# Patient Record
Sex: MALE | Race: WHITE | NOT HISPANIC OR LATINO | Employment: UNEMPLOYED | URBAN - METROPOLITAN AREA
[De-identification: names, ages, dates, MRNs, and addresses within clinical notes are randomized per-mention and may not be internally consistent; named-entity substitution may affect disease eponyms.]

---

## 2018-07-23 ENCOUNTER — HOSPITAL ENCOUNTER (EMERGENCY)
Facility: MEDICAL CENTER | Age: 58
End: 2018-07-23
Attending: EMERGENCY MEDICINE
Payer: MEDICARE

## 2018-07-23 VITALS
HEIGHT: 69 IN | HEART RATE: 88 BPM | BODY MASS INDEX: 21.22 KG/M2 | DIASTOLIC BLOOD PRESSURE: 57 MMHG | OXYGEN SATURATION: 96 % | SYSTOLIC BLOOD PRESSURE: 101 MMHG | TEMPERATURE: 98.4 F | WEIGHT: 143.3 LBS | RESPIRATION RATE: 16 BRPM

## 2018-07-23 DIAGNOSIS — Z59.00 HOMELESSNESS: ICD-10-CM

## 2018-07-23 DIAGNOSIS — Z76.0 MEDICATION REFILL: ICD-10-CM

## 2018-07-23 PROCEDURE — 99284 EMERGENCY DEPT VISIT MOD MDM: CPT

## 2018-07-23 RX ORDER — QUETIAPINE FUMARATE 25 MG/1
25 TABLET, FILM COATED ORAL 3 TIMES DAILY
COMMUNITY
End: 2018-07-23

## 2018-07-23 RX ORDER — QUETIAPINE FUMARATE 200 MG/1
400 TABLET, FILM COATED ORAL
COMMUNITY
End: 2018-07-23

## 2018-07-23 RX ORDER — QUETIAPINE FUMARATE 300 MG/1
300 TABLET, FILM COATED ORAL
Qty: 30 TAB | Refills: 1 | Status: SHIPPED | OUTPATIENT
Start: 2018-07-23

## 2018-07-23 RX ORDER — ALPRAZOLAM 1 MG/1
2 TABLET ORAL 2 TIMES DAILY
COMMUNITY

## 2018-07-23 RX ORDER — QUETIAPINE FUMARATE 25 MG/1
50 TABLET, FILM COATED ORAL 2 TIMES DAILY
Qty: 60 TAB | Refills: 1 | Status: SHIPPED | OUTPATIENT
Start: 2018-07-23

## 2018-07-23 SDOH — ECONOMIC STABILITY - HOUSING INSECURITY: HOMELESSNESS UNSPECIFIED: Z59.00

## 2018-07-23 ASSESSMENT — LIFESTYLE VARIABLES: DO YOU DRINK ALCOHOL: NO

## 2018-07-24 ENCOUNTER — PATIENT OUTREACH (OUTPATIENT)
Dept: HEALTH INFORMATION MANAGEMENT | Facility: OTHER | Age: 58
End: 2018-07-24

## 2018-07-24 NOTE — DISCHARGE PLANNING
Medical Social Work    Referral: Resources    Intervention: MSW received a call from bedside RN that per ERP pt is in need of housing resources and assistance obtaining his medications.  MSW met with pt at bedside.  Pt states that he just moved to [place] from [facility] and has limited income.  Pt states that he's been staying at a weekly motel but has no where to stay tonight.  Pt states that he's familiar with the homeless shelter and may have to stay there tonight.  Pt states that he has been without his Medicare for the past 8 years until recently.  Pt was provided with low income housing list and shelter list.  Pt was also provided with Clinic list and was recommended to follow up with [facility] to get established with a PCP.  Pt states that he will be going to the shelter tonight and requested assistance getting there.  Pt was provided with a 2-ride bus pass.  Bedside RN made aware.    Plan: Pt to D/C to the shelter via bus.

## 2018-07-24 NOTE — ED PROVIDER NOTES
"ED Provider Note    CHIEF COMPLAINT  Chief Complaint   Patient presents with   • Off Psych Meds   • Medication Refill     Seen at 8:56 PM    HPI  Woo Bowers is a 58 y.o. male with history of bipolar who presents here to have refills of his Seroquel, Xanax, possible hospitalization, a meal tray and to talk with social work about section 8 housing.    He has been living in the [facility] for the past 2 weeks.  He moved here from [place].  He ran out of money and no longer lives in the hotel room until his check comes in at the end of this month.  He has been off of his psychiatric medications for at least 3 months.  He was stabilized at an inpatient psychiatry in [place] and discharged from there several months ago.    He plans to gain employment though he feels that he needs to be stabilized him back on his psychiatric medications.    He denies any suicidality or homicidality.  REVIEW OF SYSTEMS  See HPI  PAST MEDICAL HISTORY   has a past medical history of Psychiatric disorder.    SOCIAL HISTORY  Social History     Social History Main Topics   • Smoking status: Current Every Day Smoker     Packs/day: 1.00     Years: 20.00   • Smokeless tobacco: Not on file   • Alcohol use No   • Drug use: Yes     Types: Inhaled      Comment: marihuna    • Sexual activity: Not on file       SURGICAL HISTORY   has a past surgical history that includes other abdominal surgery and other.    CURRENT MEDICATIONS  Reviewed.  See Encounter Summary.     ALLERGIES  No Known Allergies    PHYSICAL EXAM  VITAL SIGNS: BP (!) 94/57   Pulse 85   Temp 36.9 °C (98.4 °F)   Resp 16   Ht 1.753 m (5' 9\")   Wt 65 kg (143 lb 4.8 oz)   SpO2 94%   BMI 21.16 kg/m²   Constitutional: Awake, alert in no apparent distress.  HENT: Normocephalic, Bilateral external ears normal. Nose normal.   Eyes: Conjunctiva normal, non-icteric, EOMI.    Thorax & Lungs: Easy unlabored respirations  Cardiovascular:    Abdomen:  No distention  Skin: Visualized skin " is  Dry, No erythema, No rash.   Extremities:   atraumatic   Neurologic: Alert, Grossly non-focal.   Psychiatric: Affect and Mood normal, cooperative, does not appear to be responding to internal stimuli.  No pressured speech.  No psychomotor agitation or depression.    RADIOLOGY  No orders to display       Nursing notes and vital signs were reviewed. (See chart for details)    Decision Making:  This is a 58 y.o. year old male who presents with a chief complaint of medication refill.  He also would like to talk to social work about getting affordable housing, he would like to have a food tray and he is asking if it is possible to get hospitalized as he has no place to stay tonight.  There is no medical indication for hospitalization.  He is stable for discharge as he does not appear to be greatly disabled, he is not suicidal nor homicidal.  I will not refill his Xanax.  I let them of the prescription monitoring program, he does not have any prescriptions in the past year for Xanax.  I will refill his Seroquel.  I recommend he follow-up with the [facility] clinic.  Social work did give him resources and he did eat a snack prior to discharge.    DISPOSITION:  Patient will be discharged home in good condition.    Discharge Medications:  New Prescriptions    No medications on file       The patient was discharged home (see d/c instructions) and told to return immediately for any signs or symptoms listed, or any worsening at all.  The patient verbally agreed to the discharge precautions and follow-up plan which is documented in [facility].      The patient was discharged home (see d/c instructions) and parent was told to return immediately for any signs or symptoms listed, or any worsening at all.  The patient's parent verbally agreed to the discharge precautions and follow-up plan which is documented in [facility].    FINAL IMPRESSION  1. Medication refill    2. Homelessness

## 2018-07-24 NOTE — ED NOTES
Pt discharged home per [facility], after SW evaluation. Pt in stable condition, discharged instructions and prescription given. Voiced understanding.

## 2018-07-24 NOTE — ED TRIAGE NOTES
Woo Bowers  58 y.o.  Chief Complaint   Patient presents with   • Off Psych Meds   • Medication Refill     Pt self ambulatory to triage room, steady gait. NAD noted.     Pt states he is bi-polar. Pt appears manic presently. Pt states he needs his prescriptions for Seroquel and zanex. Pt states he moved from [facility] and has not been able to fill prescriptions.     Triage process explained to patient, educated pt to notify staff at  if s/s worsened, apologized for wait time, and returned pt to lobby.

## 2018-07-27 ENCOUNTER — HOSPITAL ENCOUNTER (EMERGENCY)
Facility: MEDICAL CENTER | Age: 58
End: 2018-07-27
Attending: EMERGENCY MEDICINE
Payer: MEDICARE

## 2018-07-27 VITALS
HEIGHT: 71 IN | WEIGHT: 142.2 LBS | OXYGEN SATURATION: 93 % | HEART RATE: 68 BPM | BODY MASS INDEX: 19.91 KG/M2 | TEMPERATURE: 97.9 F | DIASTOLIC BLOOD PRESSURE: 78 MMHG | SYSTOLIC BLOOD PRESSURE: 105 MMHG | RESPIRATION RATE: 18 BRPM

## 2018-07-27 DIAGNOSIS — F99 PSYCHOLOGICAL DISORDER: ICD-10-CM

## 2018-07-27 LAB
ALBUMIN SERPL BCP-MCNC: 4.4 G/DL (ref 3.2–4.9)
ALBUMIN/GLOB SERPL: 1.7 G/DL
ALP SERPL-CCNC: 52 U/L (ref 30–99)
ALT SERPL-CCNC: 18 U/L (ref 2–50)
AMPHET UR QL SCN: NEGATIVE
ANION GAP SERPL CALC-SCNC: 9 MMOL/L (ref 0–11.9)
APAP SERPL-MCNC: <10 UG/ML (ref 10–30)
AST SERPL-CCNC: 27 U/L (ref 12–45)
BARBITURATES UR QL SCN: NEGATIVE
BENZODIAZ UR QL SCN: NEGATIVE
BILIRUB SERPL-MCNC: 0.7 MG/DL (ref 0.1–1.5)
BUN SERPL-MCNC: 7 MG/DL (ref 8–22)
BZE UR QL SCN: NEGATIVE
CALCIUM SERPL-MCNC: 9.3 MG/DL (ref 8.5–10.5)
CANNABINOIDS UR QL SCN: POSITIVE
CHLORIDE SERPL-SCNC: 101 MMOL/L (ref 96–112)
CO2 SERPL-SCNC: 24 MMOL/L (ref 20–33)
CREAT SERPL-MCNC: 0.83 MG/DL (ref 0.5–1.4)
ETHANOL BLD-MCNC: 0 G/DL
GLOBULIN SER CALC-MCNC: 2.6 G/DL (ref 1.9–3.5)
GLUCOSE SERPL-MCNC: 88 MG/DL (ref 65–99)
METHADONE UR QL SCN: NEGATIVE
OPIATES UR QL SCN: NEGATIVE
OXYCODONE UR QL SCN: NEGATIVE
PCP UR QL SCN: NEGATIVE
POC BREATHALIZER: 0 PERCENT (ref 0–0.01)
POTASSIUM SERPL-SCNC: 3.4 MMOL/L (ref 3.6–5.5)
PROPOXYPH UR QL SCN: NEGATIVE
PROT SERPL-MCNC: 7 G/DL (ref 6–8.2)
SALICYLATES SERPL-MCNC: 0 MG/DL (ref 15–25)
SODIUM SERPL-SCNC: 134 MMOL/L (ref 135–145)

## 2018-07-27 PROCEDURE — 80307 DRUG TEST PRSMV CHEM ANLYZR: CPT

## 2018-07-27 PROCEDURE — 700105 HCHG RX REV CODE 258: Performed by: EMERGENCY MEDICINE

## 2018-07-27 PROCEDURE — 99284 EMERGENCY DEPT VISIT MOD MDM: CPT

## 2018-07-27 PROCEDURE — 302970 POC BREATHALIZER: Performed by: EMERGENCY MEDICINE

## 2018-07-27 PROCEDURE — 80053 COMPREHEN METABOLIC PANEL: CPT

## 2018-07-27 PROCEDURE — 90791 PSYCH DIAGNOSTIC EVALUATION: CPT

## 2018-07-27 PROCEDURE — 93005 ELECTROCARDIOGRAM TRACING: CPT | Performed by: EMERGENCY MEDICINE

## 2018-07-27 PROCEDURE — 36415 COLL VENOUS BLD VENIPUNCTURE: CPT

## 2018-07-27 RX ORDER — DIPHENHYDRAMINE HCL 12.5MG/5ML
12.5 LIQUID (ML) ORAL 4 TIMES DAILY PRN
COMMUNITY

## 2018-07-27 RX ORDER — SODIUM CHLORIDE 9 MG/ML
1000 INJECTION, SOLUTION INTRAVENOUS ONCE
Status: COMPLETED | OUTPATIENT
Start: 2018-07-27 | End: 2018-07-27

## 2018-07-27 RX ADMIN — SODIUM CHLORIDE 1000 ML: 9 INJECTION, SOLUTION INTRAVENOUS at 15:16

## 2018-07-27 NOTE — ED TRIAGE NOTES
Pt ambulatory to triage. Pt states he is being seen at [facility] and needs a medical clearence. Pt states that he takes upward of 25 benadryl a day for 3 months. He states he is addicated to benadryl. Pt states that he was without his psych meds for a while and started self-medicating with benadryl. Per chart review pt was seen here 4 days ago.     Chief Complaint   Patient presents with   • Medical Clearance     Pt placed in lobby, updated on triage process. Pt educated to notified RN or triage tech if changes in condition.
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
## 2018-07-28 NOTE — DISCHARGE PLANNING
Alert team note:  Sent packets to [facility] and [facility].  Both denied him due to lack of acuity.  Provided resources for [facility] walk-in M-F 8-5 and Dr Gtz's office M-F for medication and counseling.

## 2018-07-28 NOTE — ED PROVIDER NOTES
ED Provider Note    CHIEF COMPLAINT  Chief Complaint   Patient presents with   • Medical Clearance       HPI  Woo Bowers is a 58 y.o. male who presents to the emergency department complaining that she needs to go to [facility] for a medication adjustment.  The patient says that he is from [place] and has been stranded here in [place] for the last several months and has run out of his psychiatric medications.  The patient is trying to treat himself with Benadryl and is taking multiple tablets every day.  He says he last took several Benadryl tablets 2 hours prior to arrival in the ER.  The patient says that he went to [facility] and was told that he needs medical clearance before he can undergo treatment there and was sent to the emergency department.  The patient has no wish to harm himself or others.  Chart review shows that the patient was actually here 4 days ago requesting a medication refill and was given a refill prescription for Seroquel and was denied refill of Xanax.    REVIEW OF SYSTEMS no fever or chills no nausea vomiting no chest pain or difficulty breathing no hallucinations.  The patient does not wish to harm himself or others.  All other systems negative    PAST MEDICAL HISTORY  Past Medical History:   Diagnosis Date   • Psychiatric disorder        FAMILY HISTORY  Family History   Problem Relation Age of Onset   • Hypertension Father    • Psychiatry Brother    • Alcohol/Drug Brother        SOCIAL HISTORY  Social History     Social History   • Marital status: Single     Spouse name: N/A   • Number of children: N/A   • Years of education: N/A     Social History Main Topics   • Smoking status: Current Every Day Smoker     Packs/day: 1.00     Years: 20.00   • Smokeless tobacco: Never Used   • Alcohol use No   • Drug use: Yes     Types: Inhaled      Comment: marijuana, benadryl    • Sexual activity: Not on file     Other Topics Concern   • Not on file     Social History Narrative   • No  "narrative on file       SURGICAL HISTORY  Past Surgical History:   Procedure Laterality Date   • OTHER      tonsillectomy    • OTHER ABDOMINAL SURGERY         CURRENT MEDICATIONS  Home Medications     Reviewed by Carito Ornelas R.N. (Registered Nurse) on 07/27/18 at 1410  Med List Status: Partial   Medication Last Dose Status   ALPRAZolam (XANAX) 1 MG Tab  Active   QUEtiapine (SEROQUEL) 25 MG Tab  Active   QUEtiapine (SEROQUEL) 300 MG tablet  Active                ALLERGIES  No Known Allergies    PHYSICAL EXAM  VITAL SIGNS: /78   Pulse 68   Temp 36.6 °C (97.9 °F)   Resp 18   Ht 1.803 m (5' 11\")   Wt 64.5 kg (142 lb 3.2 oz)   SpO2 93%   BMI 19.83 kg/m²    Oxygen saturation is interpreted as adequate  Constitutional: Awake verbal ambulatory he does not at all appear sleepy or altered.  HENT: Mucous membranes are slightly dry, no sign of trauma to the head  Eyes: No erythema or discharge or jaundice pupils are round extraocular motion present  Neck: Trachea midline no JVD  Cardiovascular: Regular rate and rhythm  Lungs: Clear and equal bilaterally with no apparent difficulty breathing  Abdomen/Back: Soft nontender nondistended no rebound guarding or peritoneal findings bowel sounds are present  Skin: Warm and dry  Musculoskeletal: No acute bony deformity  Neurologic: Patient is awake and verbal he is ambulatory about the emergency department with good balance and mobility    CHART REVIEW  I reviewed the ER chart from July 23, 2018 the patient said he had been off of his psychiatric medications for 3 months and requested refill of Seroquel and Xanax and he was given a refill of Seroquel.    Laboratory  Complete metabolic panel is unremarkable urine toxicology screen is positive for cannabinoids.  Aspirin and Tylenol levels are nontoxic and blood alcohol level is 0    EKG interpretation  Twelve-lead EKG shows sinus rhythm 68 bpm there is no pathologic ST elevation or depression or ectopy the QTc interval " was at the upper limits of normal at 498.    MEDICAL DECISION MAKING and DISPOSITION  In the emergency department the patient arrived with a borderline low blood pressure and for this reason he was given intravenous fluids and this was helpful as reevaluation shows that his blood pressure has improved.  A life skills mental health evaluation was undertaken and the patient was referred to [facility] but they will not be accepting him for inpatient care.  At this point in time the patient looks well he does not require hospitalization and I think it is safe for him to be discharged he is instructed to go to [facility] Monday and arrange outpatient mental health care as soon as possible.  He may return here if he otherwise requires emergency medical care    IMPRESSION  1.  Psychiatric disorder  2.  Abuse of over-the-counter Benadryl       Electronically signed by: Charles Ellis, 7/27/2018 8:18 PM

## 2018-07-28 NOTE — DISCHARGE INSTRUCTIONS
Go to [facility] and arrange outpatient mental health services on Monday morning.  Only take Benadryl and all other medications as directed and do not take extra doses.

## 2018-07-29 LAB
EKG IMPRESSION: NORMAL
EKG IMPRESSION: NORMAL

## 2022-07-05 ENCOUNTER — INPATIENT (INPATIENT)
Facility: HOSPITAL | Age: 62
LOS: 1 days | Discharge: LEFT BEFORE DISCHARGE | End: 2022-07-07
Attending: HOSPITALIST | Admitting: HOSPITALIST

## 2022-07-05 VITALS
SYSTOLIC BLOOD PRESSURE: 108 MMHG | HEART RATE: 76 BPM | OXYGEN SATURATION: 98 % | RESPIRATION RATE: 18 BRPM | TEMPERATURE: 98 F | DIASTOLIC BLOOD PRESSURE: 64 MMHG

## 2022-07-05 LAB
ALBUMIN SERPL ELPH-MCNC: 3.5 G/DL — SIGNIFICANT CHANGE UP (ref 3.3–5)
ALP SERPL-CCNC: 155 U/L — HIGH (ref 40–120)
ALT FLD-CCNC: 43 U/L — HIGH (ref 4–41)
ANION GAP SERPL CALC-SCNC: 13 MMOL/L — SIGNIFICANT CHANGE UP (ref 7–14)
APAP SERPL-MCNC: <10 UG/ML — LOW (ref 15–25)
AST SERPL-CCNC: 72 U/L — HIGH (ref 4–40)
BILIRUB SERPL-MCNC: 0.2 MG/DL — SIGNIFICANT CHANGE UP (ref 0.2–1.2)
BUN SERPL-MCNC: 8 MG/DL — SIGNIFICANT CHANGE UP (ref 7–23)
CALCIUM SERPL-MCNC: 8.6 MG/DL — SIGNIFICANT CHANGE UP (ref 8.4–10.5)
CHLORIDE SERPL-SCNC: 98 MMOL/L — SIGNIFICANT CHANGE UP (ref 98–107)
CO2 SERPL-SCNC: 21 MMOL/L — LOW (ref 22–31)
CREAT SERPL-MCNC: 0.67 MG/DL — SIGNIFICANT CHANGE UP (ref 0.5–1.3)
EGFR: 106 ML/MIN/1.73M2 — SIGNIFICANT CHANGE UP
ETHANOL SERPL-MCNC: 127 MG/DL — HIGH
GLUCOSE SERPL-MCNC: 84 MG/DL — SIGNIFICANT CHANGE UP (ref 70–99)
HCT VFR BLD CALC: 36.3 % — LOW (ref 39–50)
HGB BLD-MCNC: 11.8 G/DL — LOW (ref 13–17)
IANC: 1.21 K/UL — LOW (ref 1.8–7.4)
LIDOCAIN IGE QN: 57 U/L — SIGNIFICANT CHANGE UP (ref 7–60)
MCHC RBC-ENTMCNC: 31.2 PG — SIGNIFICANT CHANGE UP (ref 27–34)
MCHC RBC-ENTMCNC: 32.5 GM/DL — SIGNIFICANT CHANGE UP (ref 32–36)
MCV RBC AUTO: 96 FL — SIGNIFICANT CHANGE UP (ref 80–100)
PLATELET # BLD AUTO: 206 K/UL — SIGNIFICANT CHANGE UP (ref 150–400)
POTASSIUM SERPL-MCNC: 4.3 MMOL/L — SIGNIFICANT CHANGE UP (ref 3.5–5.3)
POTASSIUM SERPL-SCNC: 4.3 MMOL/L — SIGNIFICANT CHANGE UP (ref 3.5–5.3)
PROT SERPL-MCNC: 7.1 G/DL — SIGNIFICANT CHANGE UP (ref 6–8.3)
RBC # BLD: 3.78 M/UL — LOW (ref 4.2–5.8)
RBC # FLD: 15.9 % — HIGH (ref 10.3–14.5)
SALICYLATES SERPL-MCNC: <0.3 MG/DL — LOW (ref 15–30)
SODIUM SERPL-SCNC: 132 MMOL/L — LOW (ref 135–145)
TOXICOLOGY SCREEN, DRUGS OF ABUSE, SERUM RESULT: SIGNIFICANT CHANGE UP
WBC # BLD: 2.78 K/UL — LOW (ref 3.8–10.5)
WBC # FLD AUTO: 2.78 K/UL — LOW (ref 3.8–10.5)

## 2022-07-05 PROCEDURE — 71045 X-RAY EXAM CHEST 1 VIEW: CPT | Mod: 26

## 2022-07-05 PROCEDURE — 99284 EMERGENCY DEPT VISIT MOD MDM: CPT | Mod: 25

## 2022-07-05 PROCEDURE — 93010 ELECTROCARDIOGRAM REPORT: CPT

## 2022-07-05 PROCEDURE — 74177 CT ABD & PELVIS W/CONTRAST: CPT | Mod: 26,MA

## 2022-07-05 RX ORDER — THIAMINE MONONITRATE (VIT B1) 100 MG
100 TABLET ORAL ONCE
Refills: 0 | Status: COMPLETED | OUTPATIENT
Start: 2022-07-05 | End: 2022-07-05

## 2022-07-05 RX ORDER — FOLIC ACID 0.8 MG
1 TABLET ORAL ONCE
Refills: 0 | Status: COMPLETED | OUTPATIENT
Start: 2022-07-05 | End: 2022-07-05

## 2022-07-05 RX ORDER — SODIUM CHLORIDE 9 MG/ML
1000 INJECTION INTRAMUSCULAR; INTRAVENOUS; SUBCUTANEOUS ONCE
Refills: 0 | Status: COMPLETED | OUTPATIENT
Start: 2022-07-05 | End: 2022-07-05

## 2022-07-05 RX ADMIN — SODIUM CHLORIDE 1000 MILLILITER(S): 9 INJECTION INTRAMUSCULAR; INTRAVENOUS; SUBCUTANEOUS at 21:36

## 2022-07-05 RX ADMIN — Medication 1 MILLIGRAM(S): at 21:35

## 2022-07-05 RX ADMIN — Medication 2 MILLIGRAM(S): at 21:35

## 2022-07-05 RX ADMIN — Medication 100 MILLIGRAM(S): at 21:35

## 2022-07-05 NOTE — ED PROVIDER NOTE - PHYSICAL EXAMINATION
Vital Signs Last 24 Hrs  T(C): 36.8 (05 Jul 2022 15:45), Max: 36.8 (05 Jul 2022 15:45)  T(F): 98.2 (05 Jul 2022 15:45), Max: 98.2 (05 Jul 2022 15:45)  HR: 80 (05 Jul 2022 22:47) (76 - 80)  BP: 109/71 (05 Jul 2022 22:47) (103/54 - 109/71)  BP(mean): --  RR: 18 (05 Jul 2022 22:47) (18 - 18)  SpO2: 97% (05 Jul 2022 22:47) (95% - 98%)    CONSTITUTIONAL: NAD, well-developed  EYES: PERRLA; conjunctiva and sclera clear  ENMT: Moist oral mucosa  RESPIRATORY: Normal respiratory effort; lungs are clear to auscultation bilaterally  CARDIOVASCULAR: Regular rate and rhythm, normal S1 and S2, Peripheral pulses are 2+ bilaterally  ABDOMEN: Nontender to palpation, normoactive bowel sounds  PSYCH: A+O to person, place, and time  SKIN: bilateral LE ecchymosis

## 2022-07-05 NOTE — ED PROVIDER NOTE - PROGRESS NOTE DETAILS
AMMY: I was signed out this pt pending labs and imaging. Labs show slightly elevated LFts and minimal anemia but also Utox POsitive for THC and alcohol level 127. Pt is on CIWA. CT shows large bladder but pt able to void spontaneously without any effort and has no pain on exam and not distended abd/pelv. Will admit to hospitalist for Withdrawal on Tele.
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
## 2022-07-05 NOTE — ED PROVIDER NOTE - ATTENDING CONTRIBUTION TO CARE
63yo M ho depression eoth abuse, ran out of his seroquel about 3 months ago and has been feeling very depressed so has been drinking heavily daily, decided to come in today for help to stop drinking. also complains of abdominal pain  + ho etoh withdrawl  pt tearful in ed  mild withdrawal, abd tender  labs, benzos, Ct, admit

## 2022-07-05 NOTE — ED PROVIDER NOTE - CHIEF COMPLAINT
The patient is a 62y Male complaining of  The patient is a 62y Male complaining of alcohol intoxication

## 2022-07-05 NOTE — ED PROVIDER NOTE - CLINICAL SUMMARY MEDICAL DECISION MAKING FREE TEXT BOX
62M with PMH ETOH abuse and withdrawal BIBA for alcohol intoxication. Patient is tearful and reports drinking 5 cans of beer in the last 24 hours and admitted to cocaine use. He reports lower leg pain and abdominal pain but denies N/V, HA and chest pain.

## 2022-07-05 NOTE — ED PROVIDER NOTE - NS ED ROS FT
REVIEW OF SYSTEMS:  CONSTITUTIONAL: No fevers or chills   NECK: No pain or stiffness  RESPIRATORY: No cough; No shortness of breath  CARDIOVASCULAR: No chest pain or palpitations  GASTROINTESTINAL: + abdominal. No nausea, vomiting, or diarrhea  NEUROLOGICAL: No numbness or weakness  SKIN: No itching, rashes
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
## 2022-07-05 NOTE — ED ADULT NURSE NOTE - NSIMPLEMENTINTERV_GEN_ALL_ED
Implemented All Fall Risk Interventions:  [place] to call system. Call bell, personal items and telephone within reach. Instruct patient to call for assistance. Room bathroom lighting operational. Non-slip footwear when patient is off stretcher. Physically safe environment: no spills, clutter or unnecessary equipment. Stretcher in lowest position, wheels locked, appropriate side rails in place. Provide visual cue, wrist band, yellow gown, etc. Monitor gait and stability. Monitor for mental status changes and reorient to person, place, and time. Review medications for side effects contributing to fall risk. Reinforce activity limits and safety measures with patient and family.

## 2022-07-06 DIAGNOSIS — Z90.89 ACQUIRED ABSENCE OF OTHER ORGANS: Chronic | ICD-10-CM

## 2022-07-06 DIAGNOSIS — F10.231 ALCOHOL DEPENDENCE WITH WITHDRAWAL DELIRIUM: ICD-10-CM

## 2022-07-06 DIAGNOSIS — R33.9 RETENTION OF URINE, UNSPECIFIED: ICD-10-CM

## 2022-07-06 DIAGNOSIS — N30.00 ACUTE CYSTITIS WITHOUT HEMATURIA: ICD-10-CM

## 2022-07-06 DIAGNOSIS — R74.01 ELEVATION OF LEVELS OF LIVER TRANSAMINASE LEVELS: ICD-10-CM

## 2022-07-06 DIAGNOSIS — M79.606 PAIN IN LEG, UNSPECIFIED: ICD-10-CM

## 2022-07-06 DIAGNOSIS — D72.819 DECREASED WHITE BLOOD CELL COUNT, UNSPECIFIED: ICD-10-CM

## 2022-07-06 DIAGNOSIS — Z29.9 ENCOUNTER FOR PROPHYLACTIC MEASURES, UNSPECIFIED: ICD-10-CM

## 2022-07-06 DIAGNOSIS — F10.10 ALCOHOL ABUSE, UNCOMPLICATED: ICD-10-CM

## 2022-07-06 DIAGNOSIS — E87.1 HYPO-OSMOLALITY AND HYPONATREMIA: ICD-10-CM

## 2022-07-06 LAB
ALBUMIN SERPL ELPH-MCNC: 4.1 G/DL — SIGNIFICANT CHANGE UP (ref 3.3–5)
ALP SERPL-CCNC: 125 U/L — HIGH (ref 40–120)
ALT FLD-CCNC: 43 U/L — HIGH (ref 4–41)
AMPHET UR-MCNC: NEGATIVE — SIGNIFICANT CHANGE UP
ANION GAP SERPL CALC-SCNC: 10 MMOL/L — SIGNIFICANT CHANGE UP (ref 7–14)
APPEARANCE UR: ABNORMAL
AST SERPL-CCNC: 59 U/L — HIGH (ref 4–40)
BACTERIA # UR AUTO: ABNORMAL
BARBITURATES UR SCN-MCNC: NEGATIVE — SIGNIFICANT CHANGE UP
BASOPHILS # BLD AUTO: 0.01 K/UL — SIGNIFICANT CHANGE UP (ref 0–0.2)
BASOPHILS NFR BLD AUTO: 0.4 % — SIGNIFICANT CHANGE UP (ref 0–2)
BENZODIAZ UR-MCNC: NEGATIVE — SIGNIFICANT CHANGE UP
BILIRUB DIRECT SERPL-MCNC: 0.2 MG/DL — SIGNIFICANT CHANGE UP (ref 0–0.3)
BILIRUB INDIRECT FLD-MCNC: 0.4 MG/DL — SIGNIFICANT CHANGE UP (ref 0–1)
BILIRUB SERPL-MCNC: 0.6 MG/DL — SIGNIFICANT CHANGE UP (ref 0.2–1.2)
BILIRUB UR-MCNC: NEGATIVE — SIGNIFICANT CHANGE UP
BUN SERPL-MCNC: 11 MG/DL — SIGNIFICANT CHANGE UP (ref 7–23)
CALCIUM SERPL-MCNC: 9.1 MG/DL — SIGNIFICANT CHANGE UP (ref 8.4–10.5)
CHLORIDE SERPL-SCNC: 95 MMOL/L — LOW (ref 98–107)
CHLORIDE UR-SCNC: 76 MMOL/L — SIGNIFICANT CHANGE UP
CO2 SERPL-SCNC: 28 MMOL/L — SIGNIFICANT CHANGE UP (ref 22–31)
COCAINE METAB.OTHER UR-MCNC: NEGATIVE — SIGNIFICANT CHANGE UP
COLOR SPEC: SIGNIFICANT CHANGE UP
CREAT SERPL-MCNC: 0.67 MG/DL — SIGNIFICANT CHANGE UP (ref 0.5–1.3)
CREATININE URINE RESULT, DAU: 20 MG/DL — SIGNIFICANT CHANGE UP
DIFF PNL FLD: ABNORMAL
EGFR: 106 ML/MIN/1.73M2 — SIGNIFICANT CHANGE UP
EOSINOPHIL # BLD AUTO: 0.1 K/UL — SIGNIFICANT CHANGE UP (ref 0–0.5)
EOSINOPHIL NFR BLD AUTO: 3.6 % — SIGNIFICANT CHANGE UP (ref 0–6)
EPI CELLS # UR: SIGNIFICANT CHANGE UP /HPF (ref 0–5)
GLUCOSE SERPL-MCNC: 124 MG/DL — HIGH (ref 70–99)
GLUCOSE UR QL: NEGATIVE — SIGNIFICANT CHANGE UP
HYALINE CASTS # UR AUTO: 0 /LPF — SIGNIFICANT CHANGE UP (ref 0–7)
IMM GRANULOCYTES NFR BLD AUTO: 0.7 % — SIGNIFICANT CHANGE UP (ref 0–1.5)
KETONES UR-MCNC: NEGATIVE — SIGNIFICANT CHANGE UP
LEUKOCYTE ESTERASE UR-ACNC: ABNORMAL
LYMPHOCYTES # BLD AUTO: 1.02 K/UL — SIGNIFICANT CHANGE UP (ref 1–3.3)
LYMPHOCYTES # BLD AUTO: 36.7 % — SIGNIFICANT CHANGE UP (ref 13–44)
MAGNESIUM SERPL-MCNC: 1.9 MG/DL — SIGNIFICANT CHANGE UP (ref 1.6–2.6)
METHADONE UR-MCNC: NEGATIVE — SIGNIFICANT CHANGE UP
MONOCYTES # BLD AUTO: 0.42 K/UL — SIGNIFICANT CHANGE UP (ref 0–0.9)
MONOCYTES NFR BLD AUTO: 15.1 % — HIGH (ref 2–14)
NEUTROPHILS # BLD AUTO: 1.21 K/UL — LOW (ref 1.8–7.4)
NEUTROPHILS NFR BLD AUTO: 43.5 % — SIGNIFICANT CHANGE UP (ref 43–77)
NITRITE UR-MCNC: NEGATIVE — SIGNIFICANT CHANGE UP
NRBC # BLD: 0 /100 WBCS — SIGNIFICANT CHANGE UP
NRBC # FLD: 0 K/UL — SIGNIFICANT CHANGE UP
OPIATES UR-MCNC: NEGATIVE — SIGNIFICANT CHANGE UP
OXYCODONE UR-MCNC: NEGATIVE — SIGNIFICANT CHANGE UP
PCP SPEC-MCNC: SIGNIFICANT CHANGE UP
PCP UR-MCNC: NEGATIVE — SIGNIFICANT CHANGE UP
PH UR: 6.5 — SIGNIFICANT CHANGE UP (ref 5–8)
PHOSPHATE SERPL-MCNC: 3.4 MG/DL — SIGNIFICANT CHANGE UP (ref 2.5–4.5)
POTASSIUM SERPL-MCNC: 4 MMOL/L — SIGNIFICANT CHANGE UP (ref 3.5–5.3)
POTASSIUM SERPL-SCNC: 4 MMOL/L — SIGNIFICANT CHANGE UP (ref 3.5–5.3)
POTASSIUM UR-SCNC: 24.9 MMOL/L — SIGNIFICANT CHANGE UP
PROT SERPL-MCNC: 7.5 G/DL — SIGNIFICANT CHANGE UP (ref 6–8.3)
PROT UR-MCNC: NEGATIVE — SIGNIFICANT CHANGE UP
RBC CASTS # UR COMP ASSIST: SIGNIFICANT CHANGE UP /HPF (ref 0–4)
SARS-COV-2 RNA SPEC QL NAA+PROBE: SIGNIFICANT CHANGE UP
SODIUM SERPL-SCNC: 133 MMOL/L — LOW (ref 135–145)
SODIUM UR-SCNC: 87 MMOL/L — SIGNIFICANT CHANGE UP
SP GR SPEC: 1.01 — SIGNIFICANT CHANGE UP (ref 1–1.05)
THC UR QL: POSITIVE
UROBILINOGEN FLD QL: SIGNIFICANT CHANGE UP
WBC UR QL: >50 /HPF — SIGNIFICANT CHANGE UP (ref 0–5)

## 2022-07-06 PROCEDURE — 99223 1ST HOSP IP/OBS HIGH 75: CPT

## 2022-07-06 RX ORDER — TAMSULOSIN HYDROCHLORIDE 0.4 MG/1
0.4 CAPSULE ORAL AT BEDTIME
Refills: 0 | Status: DISCONTINUED | OUTPATIENT
Start: 2022-07-06 | End: 2022-07-07

## 2022-07-06 RX ORDER — HEPARIN SODIUM 5000 [USP'U]/ML
5000 INJECTION INTRAVENOUS; SUBCUTANEOUS EVERY 8 HOURS
Refills: 0 | Status: DISCONTINUED | OUTPATIENT
Start: 2022-07-06 | End: 2022-07-07

## 2022-07-06 RX ORDER — ONDANSETRON 8 MG/1
4 TABLET, FILM COATED ORAL
Refills: 0 | Status: DISCONTINUED | OUTPATIENT
Start: 2022-07-06 | End: 2022-07-07

## 2022-07-06 RX ORDER — THIAMINE MONONITRATE (VIT B1) 100 MG
100 TABLET ORAL DAILY
Refills: 0 | Status: DISCONTINUED | OUTPATIENT
Start: 2022-07-06 | End: 2022-07-07

## 2022-07-06 RX ORDER — FOLIC ACID 0.8 MG
1 TABLET ORAL DAILY
Refills: 0 | Status: DISCONTINUED | OUTPATIENT
Start: 2022-07-06 | End: 2022-07-07

## 2022-07-06 RX ORDER — OXYCODONE AND ACETAMINOPHEN 5; 325 MG/1; MG/1
1 TABLET ORAL ONCE
Refills: 0 | Status: DISCONTINUED | OUTPATIENT
Start: 2022-07-06 | End: 2022-07-06

## 2022-07-06 RX ORDER — QUETIAPINE FUMARATE 200 MG/1
400 TABLET, FILM COATED ORAL ONCE
Refills: 0 | Status: COMPLETED | OUTPATIENT
Start: 2022-07-06 | End: 2022-07-06

## 2022-07-06 RX ORDER — QUETIAPINE FUMARATE 200 MG/1
400 TABLET, FILM COATED ORAL AT BEDTIME
Refills: 0 | Status: DISCONTINUED | OUTPATIENT
Start: 2022-07-06 | End: 2022-07-07

## 2022-07-06 RX ORDER — CEFTRIAXONE 500 MG/1
1000 INJECTION, POWDER, FOR SOLUTION INTRAMUSCULAR; INTRAVENOUS EVERY 24 HOURS
Refills: 0 | Status: DISCONTINUED | OUTPATIENT
Start: 2022-07-06 | End: 2022-07-07

## 2022-07-06 RX ORDER — ONDANSETRON 8 MG/1
4 TABLET, FILM COATED ORAL ONCE
Refills: 0 | Status: COMPLETED | OUTPATIENT
Start: 2022-07-06 | End: 2022-07-06

## 2022-07-06 RX ADMIN — OXYCODONE AND ACETAMINOPHEN 1 TABLET(S): 5; 325 TABLET ORAL at 09:23

## 2022-07-06 RX ADMIN — HEPARIN SODIUM 5000 UNIT(S): 5000 INJECTION INTRAVENOUS; SUBCUTANEOUS at 21:42

## 2022-07-06 RX ADMIN — Medication 2 MILLIGRAM(S): at 11:26

## 2022-07-06 RX ADMIN — Medication 2 MILLIGRAM(S): at 21:44

## 2022-07-06 RX ADMIN — Medication 1 TABLET(S): at 11:25

## 2022-07-06 RX ADMIN — QUETIAPINE FUMARATE 400 MILLIGRAM(S): 200 TABLET, FILM COATED ORAL at 04:54

## 2022-07-06 RX ADMIN — CEFTRIAXONE 100 MILLIGRAM(S): 500 INJECTION, POWDER, FOR SOLUTION INTRAMUSCULAR; INTRAVENOUS at 11:26

## 2022-07-06 RX ADMIN — Medication 2 MILLIGRAM(S): at 18:07

## 2022-07-06 RX ADMIN — ONDANSETRON 4 MILLIGRAM(S): 8 TABLET, FILM COATED ORAL at 04:58

## 2022-07-06 RX ADMIN — Medication 1 MILLIGRAM(S): at 11:26

## 2022-07-06 RX ADMIN — Medication 2 MILLIGRAM(S): at 14:54

## 2022-07-06 RX ADMIN — Medication 100 MILLIGRAM(S): at 11:26

## 2022-07-06 RX ADMIN — HEPARIN SODIUM 5000 UNIT(S): 5000 INJECTION INTRAVENOUS; SUBCUTANEOUS at 14:54

## 2022-07-06 RX ADMIN — TAMSULOSIN HYDROCHLORIDE 0.4 MILLIGRAM(S): 0.4 CAPSULE ORAL at 21:42

## 2022-07-06 RX ADMIN — QUETIAPINE FUMARATE 400 MILLIGRAM(S): 200 TABLET, FILM COATED ORAL at 22:52

## 2022-07-06 NOTE — SBIRT NOTE ADULT - NSSBIRTALCACTION/INTER_GEN_A_CORE
Brief intervention pt is being admitted. upon discharge. Would like to go into a rehab program./Brief intervention

## 2022-07-06 NOTE — H&P ADULT - PROBLEM SELECTOR PLAN 2
Distended bladder on CT abd  Monitor I & os  Bladder scan for post void residual   If needed straight cath x 3 then cheema if still urinary retention Distended bladder on CT abd w/ mild bilateral hydroureter  Monitor I & os  Bladder scan for post void residual - Pt just voided 600cc  If needed straight cath x 3 then cheema if still urinary retention  Start flomax.

## 2022-07-06 NOTE — H&P ADULT - PROBLEM SELECTOR PLAN 1
Alcohol withdrawal ( Current CIWA=4)  Alk phos=155 ;AST 52 ;ALT=43  CIWA- ativan taper/symptom triggered, thiamine, MVI, folic acid   CT abd pelvis: Very distended urinary bladder raises possibility of acute on chronic bladder outlet obstruction. There is also mild bilateral hydroureter.  Consider decompression with Su catheter is the patient is unable to   spontaneously void.2. Hepatic steatosis. Dilated common duct, could be related to biliary stasis.  CXR: MITUL ;  Urine tx: +THC  UA: mod bacteria Check urine cx  EKG: SR @ 74 bpm Flipped Ts V1- v2  Case to be discussed with hospitalist Alcohol withdrawal ( Current CIWA=4)  Alk phos=155 ;AST 52 ;ALT=43  CIWA- ativan taper/symptom triggered, thiamine, MVI, folic acid   CT abd pelvis: Very distended urinary bladder raises possibility of acute on chronic bladder outlet obstruction. There is also mild bilateral hydroureter.  Consider decompression with Su catheter is the patient is unable to   spontaneously void.2. Hepatic steatosis. Dilated common duct, could be related to biliary stasis.  CXR: MITUL ;  Urine tx: +THC    EKG: SR @ 74 bpm Flipped Ts V1- v2  Case discussed with Dr Obando.

## 2022-07-06 NOTE — SBIRT NOTE ADULT - NSSBIRTBRIEFINTDET_GEN_A_CORE
Provided SBIRT services.  Patient provided with motivational information for reducing and discussed healthy guidelines for low level of risk.  (Brief Intervention Performed)

## 2022-07-06 NOTE — CHART NOTE - NSCHARTNOTEFT_GEN_A_CORE
[facility] NIGHT MEDICINE COVERAGE    Notified by RN that pt's bladder scan showed 800cc.  Su ordered.  Will monitor output.  Pt stable at this time, will continue to monitor.    Chaim Rhoades PA-C  Department of Medicine - [facility]  In-House Pager: #[phone]

## 2022-07-06 NOTE — H&P ADULT - NSICDXPASTSURGICALHX_GEN_ALL_CORE_FT
PAST SURGICAL HISTORY:  No significant past surgical history PAST SURGICAL HISTORY:  S/P tonsillectomy
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
## 2022-07-06 NOTE — PATIENT PROFILE ADULT - FALL HARM RISK - HARM RISK INTERVENTIONS
Assistance with ambulation/Assistance OOB with selected safe patient handling equipment/Communicate Risk of Fall with Harm to all staff/Monitor for mental status changes/Monitor gait and stability/Reinforce activity limits and safety measures with patient and family/Tailored Fall Risk Interventions/Toileting schedule using arm’s reach rule for commode and bathroom/Use of alarms - bed, chair and/or voice tab/Visual Cue: Yellow wristband and red socks/Bed in lowest position, wheels locked, appropriate side rails in place/Call bell, personal items and telephone in reach/Instruct patient to call for assistance before getting out of bed or chair/Non-slip footwear when patient is out of bed/[place] to call system/Physically safe environment - no spills, clutter or unnecessary equipment/Purposeful Proactive Rounding/Room/bathroom lighting operational, light cord in reach

## 2022-07-06 NOTE — H&P ADULT - NSHPSOCIALHISTORY_GEN_ALL_CORE
Patient usually lives in [facility] and is currently homeless/living on the street here in [place]  + marijuana  + cocaine use only recently (Patient states " I just tried some"- unable to quantify amount).   + ETOH abuse ( drinks 6 large beers daily) Patient usually lives in [facility] and is currently homeless/living on the street here in [place]  + marijuana  + cocaine use only recently (Patient states " I just tried some"- unable to quantify amount).   + I have taken oxycontin from my friend in the past  + ETOH abuse ( drinks 6 large beers daily)
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
## 2022-07-06 NOTE — H&P ADULT - RESPIRATORY
normal/clear to auscultation bilaterally/no wheezes/no rales/no rhonchi/no respiratory distress/no use of accessory muscles/no subcutaneous emphysema/breath sounds equal/good air movement/respirations non-labored/no intercostal retractions/no dull ness or hyperresonance to percussion

## 2022-07-06 NOTE — PATIENT PROFILE ADULT - NSTRANSFERBELONGINGSRESP_GEN_A_NUR
S: Above noted.  2 DAY F/U CELLULITIS OD**    OD FEELS MUCH BETTER  90% resolved    Some tearing OD still: better  No disch    No (other) associated signs/symptoms.    OS OK    PAST MEDICAL HISTORY/PROBLEMS/PAST SURGICAL HISTORY/FAMILY HISTORY/SOCIAL HISTORY: reviewed  --h/o Sinus dz** and infections q yr    POH: NOTED    Past Surgical History:   Procedure Laterality Date   • Blepharoplasty upper bilateral 1.5 hrs  1998     • Remv cataract extracap insert lens Right 12/16/2010    IOL right ZCBOO 20.5 Dr. Gold   • Remv cataract extracap insert lens Left 10/03/13    phaco with iol left      --h/o Preseptal Cellulitis OS '09      GTTS  OD:  --VIGAMOX 4X/D OD    PO AUGMENTIN BID since 2/15/20**    : 100/100  PUPILS: - RAPD     MB: ORTHO  VERSIONS: FULL OU**  No diplopia    SLE :       L/L/L:  OD: MINIMAL RESIDUAL erythema/edema RUL/RLL : MUCH BETTER**  --NB: + perioc pigmentation x 4 lids OU   //OS: nl       CONJ:  OD: 1+ inj and NO chemosis : BETTER //OS: clear          CORNEA:  OD: clear  //OS: clear       AC:  OD: d,q  // OS: d,q       IRIS:  OD: clear  //OS: clear       LENS:  OD: PC IOL centered  //OS: PC IOL centered    EXT EXAM:  Normal OS    OD:  retropulsion: + tender, but no resist   no proptosis    A:  See diagnosis below; tech note reviewed and accepted.    P:  See orders/disposition.    COMMENT: observation reccommended, pt concerned...reassured, conditon(s) improving** and questions answered     Findings consistent with:    Preseptal cellulitis OD  (primary encounter diagnosis) **  --due to underlying URI / Sinusitis  --2nd Acute bacterial conjunctivitis of right eye       Cont PO abx: FINISH 10 D COURSE**    CONT GTTS  --VIGAMOX 4X/D OD X 1 WK    CONT WARM compresses OD     RTC PRN**    Call or return to clinic IMMED as needed if these symptoms worsen, fail to improve as anticipated, or if new symptoms develop.            yes
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
## 2022-07-07 VITALS
OXYGEN SATURATION: 99 % | RESPIRATION RATE: 18 BRPM | SYSTOLIC BLOOD PRESSURE: 133 MMHG | TEMPERATURE: 98 F | HEART RATE: 97 BPM | DIASTOLIC BLOOD PRESSURE: 80 MMHG

## 2022-07-07 LAB
A1C WITH ESTIMATED AVERAGE GLUCOSE RESULT: 5 % — SIGNIFICANT CHANGE UP (ref 4–5.6)
ALBUMIN SERPL ELPH-MCNC: 4 G/DL — SIGNIFICANT CHANGE UP (ref 3.3–5)
ALP SERPL-CCNC: 115 U/L — SIGNIFICANT CHANGE UP (ref 40–120)
ALT FLD-CCNC: 34 U/L — SIGNIFICANT CHANGE UP (ref 4–41)
ANION GAP SERPL CALC-SCNC: 12 MMOL/L — SIGNIFICANT CHANGE UP (ref 7–14)
AST SERPL-CCNC: 42 U/L — HIGH (ref 4–40)
BASOPHILS # BLD AUTO: 0.02 K/UL — SIGNIFICANT CHANGE UP (ref 0–0.2)
BASOPHILS NFR BLD AUTO: 0.5 % — SIGNIFICANT CHANGE UP (ref 0–2)
BILIRUB SERPL-MCNC: 0.6 MG/DL — SIGNIFICANT CHANGE UP (ref 0.2–1.2)
BUN SERPL-MCNC: 12 MG/DL — SIGNIFICANT CHANGE UP (ref 7–23)
CALCIUM SERPL-MCNC: 9.4 MG/DL — SIGNIFICANT CHANGE UP (ref 8.4–10.5)
CHLORIDE SERPL-SCNC: 97 MMOL/L — LOW (ref 98–107)
CHOLEST SERPL-MCNC: 225 MG/DL — HIGH
CO2 SERPL-SCNC: 24 MMOL/L — SIGNIFICANT CHANGE UP (ref 22–31)
CREAT SERPL-MCNC: 0.68 MG/DL — SIGNIFICANT CHANGE UP (ref 0.5–1.3)
EGFR: 105 ML/MIN/1.73M2 — SIGNIFICANT CHANGE UP
EOSINOPHIL # BLD AUTO: 0.12 K/UL — SIGNIFICANT CHANGE UP (ref 0–0.5)
EOSINOPHIL NFR BLD AUTO: 2.8 % — SIGNIFICANT CHANGE UP (ref 0–6)
ESTIMATED AVERAGE GLUCOSE: 97 — SIGNIFICANT CHANGE UP
GLUCOSE SERPL-MCNC: 94 MG/DL — SIGNIFICANT CHANGE UP (ref 70–99)
HAV IGM SER-ACNC: SIGNIFICANT CHANGE UP
HBV CORE IGM SER-ACNC: SIGNIFICANT CHANGE UP
HBV SURFACE AG SER-ACNC: SIGNIFICANT CHANGE UP
HCT VFR BLD CALC: 38.3 % — LOW (ref 39–50)
HCV AB S/CO SERPL IA: 0.49 S/CO — SIGNIFICANT CHANGE UP (ref 0–0.99)
HCV AB SERPL-IMP: SIGNIFICANT CHANGE UP
HDLC SERPL-MCNC: 146 MG/DL — SIGNIFICANT CHANGE UP
HGB BLD-MCNC: 13.2 G/DL — SIGNIFICANT CHANGE UP (ref 13–17)
IANC: 2.44 K/UL — SIGNIFICANT CHANGE UP (ref 1.8–7.4)
IMM GRANULOCYTES NFR BLD AUTO: 0.7 % — SIGNIFICANT CHANGE UP (ref 0–1.5)
LIPID PNL WITH DIRECT LDL SERPL: 69 MG/DL — SIGNIFICANT CHANGE UP
LYMPHOCYTES # BLD AUTO: 0.98 K/UL — LOW (ref 1–3.3)
LYMPHOCYTES # BLD AUTO: 23 % — SIGNIFICANT CHANGE UP (ref 13–44)
MAGNESIUM SERPL-MCNC: 2 MG/DL — SIGNIFICANT CHANGE UP (ref 1.6–2.6)
MCHC RBC-ENTMCNC: 31.3 PG — SIGNIFICANT CHANGE UP (ref 27–34)
MCHC RBC-ENTMCNC: 34.5 GM/DL — SIGNIFICANT CHANGE UP (ref 32–36)
MCV RBC AUTO: 93.6 FL — SIGNIFICANT CHANGE UP (ref 80–100)
MONOCYTES # BLD AUTO: 0.68 K/UL — SIGNIFICANT CHANGE UP (ref 0–0.9)
MONOCYTES NFR BLD AUTO: 15.9 % — HIGH (ref 2–14)
NEUTROPHILS # BLD AUTO: 2.44 K/UL — SIGNIFICANT CHANGE UP (ref 1.8–7.4)
NEUTROPHILS NFR BLD AUTO: 57.1 % — SIGNIFICANT CHANGE UP (ref 43–77)
NON HDL CHOLESTEROL: 79 MG/DL — SIGNIFICANT CHANGE UP
NRBC # BLD: 0 /100 WBCS — SIGNIFICANT CHANGE UP
NRBC # FLD: 0 K/UL — SIGNIFICANT CHANGE UP
PHOSPHATE SERPL-MCNC: 3.8 MG/DL — SIGNIFICANT CHANGE UP (ref 2.5–4.5)
PLATELET # BLD AUTO: 224 K/UL — SIGNIFICANT CHANGE UP (ref 150–400)
POTASSIUM SERPL-MCNC: 4.1 MMOL/L — SIGNIFICANT CHANGE UP (ref 3.5–5.3)
POTASSIUM SERPL-SCNC: 4.1 MMOL/L — SIGNIFICANT CHANGE UP (ref 3.5–5.3)
PROT SERPL-MCNC: 7.6 G/DL — SIGNIFICANT CHANGE UP (ref 6–8.3)
RBC # BLD: 4.22 M/UL — SIGNIFICANT CHANGE UP (ref 4.2–5.8)
RBC # FLD: 15.9 % — HIGH (ref 10.3–14.5)
SODIUM SERPL-SCNC: 133 MMOL/L — LOW (ref 135–145)
TRIGL SERPL-MCNC: 49 MG/DL — SIGNIFICANT CHANGE UP
TSH SERPL-MCNC: 2.96 UIU/ML — SIGNIFICANT CHANGE UP (ref 0.27–4.2)
WBC # BLD: 4.27 K/UL — SIGNIFICANT CHANGE UP (ref 3.8–10.5)
WBC # FLD AUTO: 4.27 K/UL — SIGNIFICANT CHANGE UP (ref 3.8–10.5)

## 2022-07-07 PROCEDURE — 99239 HOSP IP/OBS DSCHRG MGMT >30: CPT

## 2022-07-07 PROCEDURE — 90792 PSYCH DIAG EVAL W/MED SRVCS: CPT

## 2022-07-07 PROCEDURE — 51702 INSERT TEMP BLADDER CATH: CPT

## 2022-07-07 RX ORDER — QUETIAPINE FUMARATE 200 MG/1
25 TABLET, FILM COATED ORAL
Refills: 0 | Status: DISCONTINUED | OUTPATIENT
Start: 2022-07-07 | End: 2022-07-07

## 2022-07-07 RX ORDER — FOLIC ACID 0.8 MG
1 TABLET ORAL
Qty: 30 | Refills: 0
Start: 2022-07-07 | End: 2022-08-05

## 2022-07-07 RX ORDER — THIAMINE MONONITRATE (VIT B1) 100 MG
1 TABLET ORAL
Qty: 30 | Refills: 0
Start: 2022-07-07 | End: 2022-08-05

## 2022-07-07 RX ORDER — FINASTERIDE 5 MG/1
1 TABLET, FILM COATED ORAL
Qty: 30 | Refills: 0
Start: 2022-07-07 | End: 2022-08-05

## 2022-07-07 RX ORDER — ALPRAZOLAM 0.25 MG
1 TABLET ORAL
Qty: 0 | Refills: 0 | DISCHARGE

## 2022-07-07 RX ORDER — TAMSULOSIN HYDROCHLORIDE 0.4 MG/1
1 CAPSULE ORAL
Qty: 30 | Refills: 0
Start: 2022-07-07 | End: 2022-08-05

## 2022-07-07 RX ORDER — QUETIAPINE FUMARATE 200 MG/1
1 TABLET, FILM COATED ORAL
Qty: 0 | Refills: 0 | DISCHARGE

## 2022-07-07 RX ORDER — PANTOPRAZOLE SODIUM 20 MG/1
40 TABLET, DELAYED RELEASE ORAL
Refills: 0 | Status: DISCONTINUED | OUTPATIENT
Start: 2022-07-07 | End: 2022-07-07

## 2022-07-07 RX ORDER — FINASTERIDE 5 MG/1
5 TABLET, FILM COATED ORAL DAILY
Refills: 0 | Status: DISCONTINUED | OUTPATIENT
Start: 2022-07-07 | End: 2022-07-07

## 2022-07-07 RX ORDER — QUETIAPINE FUMARATE 200 MG/1
1 TABLET, FILM COATED ORAL
Qty: 30 | Refills: 0
Start: 2022-07-07 | End: 2022-08-05

## 2022-07-07 RX ORDER — QUETIAPINE FUMARATE 200 MG/1
300 TABLET, FILM COATED ORAL AT BEDTIME
Refills: 0 | Status: DISCONTINUED | OUTPATIENT
Start: 2022-07-07 | End: 2022-07-07

## 2022-07-07 RX ORDER — MOXIFLOXACIN HYDROCHLORIDE TABLETS, 400 MG 400 MG/1
1 TABLET, FILM COATED ORAL
Qty: 10 | Refills: 0
Start: 2022-07-07 | End: 2022-07-11

## 2022-07-07 RX ORDER — OXYCODONE AND ACETAMINOPHEN 5; 325 MG/1; MG/1
1 TABLET ORAL ONCE
Refills: 0 | Status: DISCONTINUED | OUTPATIENT
Start: 2022-07-07 | End: 2022-07-07

## 2022-07-07 RX ORDER — CIPROFLOXACIN LACTATE 400MG/40ML
1 VIAL (ML) INTRAVENOUS
Qty: 5 | Refills: 0
Start: 2022-07-07 | End: 2022-07-11

## 2022-07-07 RX ORDER — THIAMINE MONONITRATE (VIT B1) 100 MG
500 TABLET ORAL EVERY 8 HOURS
Refills: 0 | Status: DISCONTINUED | OUTPATIENT
Start: 2022-07-07 | End: 2022-07-07

## 2022-07-07 RX ADMIN — Medication 2 MILLIGRAM(S): at 06:22

## 2022-07-07 RX ADMIN — OXYCODONE AND ACETAMINOPHEN 1 TABLET(S): 5; 325 TABLET ORAL at 15:22

## 2022-07-07 RX ADMIN — Medication 1 TABLET(S): at 13:26

## 2022-07-07 RX ADMIN — HEPARIN SODIUM 5000 UNIT(S): 5000 INJECTION INTRAVENOUS; SUBCUTANEOUS at 13:27

## 2022-07-07 RX ADMIN — Medication 2 MILLIGRAM(S): at 13:27

## 2022-07-07 RX ADMIN — Medication 2 MILLIGRAM(S): at 02:30

## 2022-07-07 RX ADMIN — Medication 1.5 MILLIGRAM(S): at 15:22

## 2022-07-07 RX ADMIN — FINASTERIDE 5 MILLIGRAM(S): 5 TABLET, FILM COATED ORAL at 13:38

## 2022-07-07 RX ADMIN — HEPARIN SODIUM 5000 UNIT(S): 5000 INJECTION INTRAVENOUS; SUBCUTANEOUS at 06:22

## 2022-07-07 RX ADMIN — OXYCODONE AND ACETAMINOPHEN 1 TABLET(S): 5; 325 TABLET ORAL at 16:17

## 2022-07-07 RX ADMIN — Medication 1.5 MILLIGRAM(S): at 10:58

## 2022-07-07 RX ADMIN — CEFTRIAXONE 100 MILLIGRAM(S): 500 INJECTION, POWDER, FOR SOLUTION INTRAMUSCULAR; INTRAVENOUS at 10:29

## 2022-07-07 RX ADMIN — Medication 2 MILLIGRAM(S): at 09:28

## 2022-07-07 RX ADMIN — Medication 1 MILLIGRAM(S): at 13:27

## 2022-07-07 RX ADMIN — Medication 105 MILLIGRAM(S): at 15:21

## 2022-07-07 NOTE — PROGRESS NOTE ADULT - PROBLEM SELECTOR PLAN 1
Alcohol withdrawal with agitation, very anxious to leave  c/w ciwa taper  pt is undomiciled from [facility], states he lives in a shelter of a [employer], previous h/o alcohol/substance abuse and anxiety/depression, on seroquel in past, hasn't followed up with a psychiatrist recently   psychiatry consult for agitation and anxiety/depression  SBIRT [place]   c/w ciwa protocol, iv thiamine, folate Alcohol withdrawal with agitation, very anxious to leave, ciwa score very high at 13  c/w ciwa protocol   pt is undomiciled from [facility], states he lives in a shelter of a [employer], previous h/o alcohol/substance abuse and anxiety/depression, on seroquel in past, hasn't followed up with a psychiatrist recently   psychiatry consult for agitation and anxiety/depression  SBIRT eval   c/w ciwa protocol, iv thiamine, folate Alcohol withdrawal with agitation, very anxious to leave, ciwa score very high at 13  c/w ciwa protocol   pt is undomiciled from [facility], states he lives in a shelter of a [employer], previous h/o alcohol/substance abuse and anxiety/depression, on seroquel in past, hasn't followed up with a psychiatrist recently   psychiatry consult for agitation and anxiety/depression  SBIRT [place]   pt states he's on seroquel 400mg hs, check QTc on EKG, hold if QTc>500ms  c/w ciwa protocol, iv thiamine, folate

## 2022-07-07 NOTE — BH CONSULTATION LIAISON ASSESSMENT NOTE - NSBHATTESTCOMMENTATTENDFT_PSY_A_CORE
Chart reviewed. Patient seen and examined with PA Student Cecilia. I agree with his history, MSE, A/P with below additions. In brief, patient states that he came here to get back on seroquel. He seems to somewhat minimize his alcohol use. He denies SI/HI. Denies AH/VH. He does not appear to be in DTs. He is linear. He has a grossly euthymic affect. He is future oriented.  Agree with plan per above. Call with questions.

## 2022-07-07 NOTE — DISCHARGE NOTE PROVIDER - NSDCMRMEDTOKEN_GEN_ALL_CORE_FT
ciprofloxacin 500 mg oral tablet, extended release: 1 tab(s) orally every 24 hours   finasteride 5 mg oral tablet: 1 tab(s) orally once a day  folic acid 1 mg oral tablet: 1 tab(s) orally once a day  Multiple Vitamins oral tablet: 1 tab(s) orally once a day  QUEtiapine 300 mg oral tablet: 1 tab(s) orally once a day (at bedtime)  tamsulosin 0.4 mg oral capsule: 1 cap(s) orally once a day (at bedtime)  thiamine 100 mg oral tablet: 1 tab(s) orally once a day    Cipro 500 mg oral tablet: 1 tab(s) orally every 12 hours  finasteride 5 mg oral tablet: 1 tab(s) orally once a day  folic acid 1 mg oral tablet: 1 tab(s) orally once a day  Multiple Vitamins oral tablet: 1 tab(s) orally once a day  QUEtiapine 300 mg oral tablet: 1 tab(s) orally once a day (at bedtime)  tamsulosin 0.4 mg oral capsule: 1 cap(s) orally once a day (at bedtime)  thiamine 100 mg oral tablet: 1 tab(s) orally once a day

## 2022-07-07 NOTE — PROCEDURE NOTE - ADDITIONAL PROCEDURE DETAILS
Called for assistance with difficult cheema placement.  Retaining ~800cc on bladder scan.    18F coude placed in sterile technique, no anatomical abnormalities appreciated, no resistance.     -Continue cheema  -Strict I+O monitoring  -Continue Flomax    -Urology [id number]

## 2022-07-07 NOTE — PROVIDER CONTACT NOTE (OTHER) - BACKGROUND
Patient admitted on 7/6/22 for alcohol dependence with withdrawal delirium.
Patient admitted on 7/6/22 for alcohol dependence with withdrawal delirium.

## 2022-07-07 NOTE — BH CONSULTATION LIAISON ASSESSMENT NOTE - CURRENT MEDICATION
MEDICATIONS  (STANDING):  cefTRIAXone   IVPB 1000 milliGRAM(s) IV Intermittent every 24 hours  finasteride 5 milliGRAM(s) Oral daily  folic acid 1 milliGRAM(s) Oral daily  heparin   Injectable 5000 Unit(s) SubCutaneous every 8 hours  LORazepam   Injectable   IV Push   LORazepam   Injectable 1.5 milliGRAM(s) IV Push every 4 hours  multivitamin 1 Tablet(s) Oral daily  oxycodone    5 mG/acetaminophen 325 mG 1 Tablet(s) Oral once  pantoprazole    Tablet 40 milliGRAM(s) Oral before breakfast  QUEtiapine 400 milliGRAM(s) Oral at bedtime  tamsulosin 0.4 milliGRAM(s) Oral at bedtime  thiamine IVPB 500 milliGRAM(s) IV Intermittent every 8 hours    MEDICATIONS  (PRN):  LORazepam   Injectable 2 milliGRAM(s) IV Push every 1 hour PRN Symptom-triggered: each CIWA -Ar score 8 or GREATER  ondansetron Injectable 4 milliGRAM(s) IV Push four times a day PRN Nausea and/or Vomiting

## 2022-07-07 NOTE — BH CONSULTATION LIAISON ASSESSMENT NOTE - NSBHCONSULTFOLLOWAFTERCARE_PSY_A_CORE FT
Patient has no psychiatric contraindication to discharge- with that said the primary team is currently treating patient for alcohol withdrawal. Defer to their treatment plan for this issue.    SBIRT consult for possible inpatient alcohol rehab (this cannot be forced if patient no longer is interested)    [facility]  [phone] [place] [id number] [phone]

## 2022-07-07 NOTE — DISCHARGE NOTE PROVIDER - HOSPITAL COURSE
2M with PMH ETOH abuse/withdrawal, ? DT's in the past, BIBA for alcohol intoxication/ withdrawal  Hospital Course   ·  Alcohol abuse. Pt Started on ativan taper, Did not Complete as ge wished to sign against medical advice    ON folate, Thiamine     ·  Urinary retention.   ·  Plan: CT abd pelvis: Very distended urinary bladder raises possibility of acute on chronic bladder outlet obstruction. There is also mild bilateral hydroureter.   Pt Had a cheema placed by Urology, But decided to have it removed and will Follow up in [place] , Started on Flomax and Proscar     ·  Transaminitis. Improving   ·  Plan: Transaminitis w/ dilated CBD  hold off MRCP for now, alk phos 115, TB 0.6, ast/alt 42/34, not significantly elevated  A1c 5.    · Hyponatremia.     ·  Acute cystitis.  urine cx E. coli, f/u sensitivity , TRansitioned to Cipro x 5 more Days   Called by nurse to evaluate patient who wishes to leave the hospital against medical advice. Patient is A&O x3 and has full capacity to make his or her own medical decisions. Pt was informed of their medical conditions, benefits, and alternatives to treatment as well as the risks of refusing treatment and the seriousness of leaving against medical advice such as the risks of heart attack, stroke, seizure, and even death were fully explained to the patient.  After expressing full understanding, patient then signs out against medical advice witnessed by the nurse.  Attending made aware. 2M with PMH ETOH abuse/withdrawal, ? DT's in the past, BIBA for alcohol intoxication/ withdrawal  Hospital Course   ·  Alcohol abuse. Pt Started on ativan taper, Did not Complete as he wished to sign against medical advice    ON folate, Thiamine     ·  Urinary retention.   ·  Plan: CT abd pelvis: Very distended urinary bladder raises possibility of acute on chronic bladder outlet obstruction. There is also mild bilateral hydroureter.   Pt Had a cheema placed by Urology, But decided to have it removed and will Follow up in [place] , Started on Flomax and Proscar     ·  Transaminitis. Improving   ·  Plan: Transaminitis w/ dilated CBD  hold off MRCP for now, alk phos 115, TB 0.6, ast/alt 42/34, not significantly elevated  A1c 5.    · Hyponatremia.     ·  Acute cystitis.  urine cx E. coli, f/u sensitivity , TRansitioned to Cipro x 5 more Days     Called by nurse to evaluate patient who wishes to leave the hospital against medical advice. Patient is A&O x3 and has full capacity to make his or her own medical decisions. Pt was informed of their medical conditions, benefits, and alternatives to treatment as well as the risks of refusing treatment and the seriousness of leaving against medical advice such as the risks of heart attack, stroke, seizure, and even death were fully explained to the patient.  After expressing full understanding, patient then signs out against medical advice witnessed by the nurse.  Attending made aware.

## 2022-07-07 NOTE — BH CONSULTATION LIAISON ASSESSMENT NOTE - HPI (INCLUDE ILLNESS QUALITY, SEVERITY, DURATION, TIMING, CONTEXT, MODIFYING FACTORS, ASSOCIATED SIGNS AND SYMPTOMS)
62 year old M with PPHx of bipolar disorder and PMHx ETOH abuse/withdrawal, BIBA for alcohol intoxication. CL psychiatry consulted for anxiety and ETOH withdrawal. Pt seen this PM. Pt calm and cooperative. Pt states he came into hospital bc he needs Seroquel. Pt was previously prescribed Seroquel 400mg qd but has not taken it for 2 months. Pt states he lives on street in [place] but came to [place] to find a place to live. Pt states he is depressed about his situation and drinks about 4 beers/day. Pt states he has hx of bipolar diagnosis. Pt admits to pressured speech and increased energy when manic. Pt received 400mg Seroquel last night. Agrees to taking 300mg of Seroquel.  62 year old M with PPHx of bipolar disorder and PMHx ETOH abuse/withdrawal, BIBA for alcohol intoxication. CL psychiatry consulted for anxiety and ETOH withdrawal.     Of note, prior to patient being seen, there was a code grey as patient was found outside [facility] in the hospital. He was brought up back to his room and given 2mg of ativan. He had recent CIWA scores of 13 however his vitals were grossly wnl.    Pt seen this PM. Pt calm and cooperative. Pt states he came into hospital bc he needs Seroquel. Pt was previously prescribed Seroquel 400mg qd but has not taken it for 2 months. Pt states he lives on street in [place] but came to [place] to find a place to live. Pt states he is depressed about his situation and drinks about 4 beers/day. Pt states he has hx of bipolar diagnosis. Pt admits to pressured speech and increased energy when manic. Pt received 400mg Seroquel last night. He initially did not want the seroquel to be lower than that as he felt that last night he slept well with 400mg. CL team explained concern with initiating seroquel at 400mg but he asked if it can then be lowered to 300 rather than 200. With regard to alcohol intake- he states that he drinks 4-5 beers a day but denies history of alcohol withdrawal, denies history of alcohol withdrawal seizures, denies DTs. He denies other drug use- though then later admits to occasional MJ use. He is interested in potential inpatient alcohol rehab.
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
## 2022-07-07 NOTE — BH CONSULTATION LIAISON ASSESSMENT NOTE - NSBHCHARTREVIEWVS_PSY_A_CORE FT
Vital Signs Last 24 Hrs  T(C): 36.7 (07 Jul 2022 10:00), Max: 37.1 (07 Jul 2022 00:13)  T(F): 98.1 (07 Jul 2022 10:00), Max: 98.8 (07 Jul 2022 01:50)  HR: 97 (07 Jul 2022 10:00) (76 - 97)  BP: 130/81 (07 Jul 2022 10:00) (112/80 - 137/89)  BP(mean): --  RR: 19 (07 Jul 2022 10:00) (16 - 19)  SpO2: 97% (07 Jul 2022 10:00) (97% - 100%)

## 2022-07-07 NOTE — DISCHARGE NOTE PROVIDER - NSDCCPCAREPLAN_GEN_ALL_CORE_FT
PRINCIPAL DISCHARGE DIAGNOSIS  Diagnosis: Alcohol withdrawal  Assessment and Plan of Treatment: Pt Signed out l against medical advice. Patient is A&O x3 and has full capacity to make his or her own medical decisions. Pt was informed of their medical conditions, benefits, and alternatives to treatment as well as the risks of refusing treatment and the seriousness of leaving against medical advice such as the risks of heart attack, stroke, seizure, and even death were fully explained to the patient.  After expressing full understanding, patient then signs out against medical advice witnessed by the nurse.  Attending made aware.        SECONDARY DISCHARGE DIAGNOSES  Diagnosis: Urinary retention  Assessment and Plan of Treatment: you had a cheema Catheter placed as you were unable to urinate, you chose to leave the Hospital against medical advice  YourCatheter was removed, Pt made aware that he may need to seek medical Treatment if he is unable to void  pt is signing out against medical Advice    Diagnosis: Transaminitis  Assessment and Plan of Treatment: You had some elevation in your liver enzyme, LIkely due to alcohol  Please Folow up with Your PMD in 1 week    Diagnosis: Acute cystitis  Assessment and Plan of Treatment: You were diagnosed with a urinary tract infection   You were started with IV antibiotocs  a prescription was sent to [facility] for continued treatment

## 2022-07-07 NOTE — PROGRESS NOTE ADULT - PROBLEM SELECTOR PLAN 3
Transaminitis w/ dilated CBD  hold off MRCP for now, alk phos 115, TB 0.6, ast/alt 42/34, not significantly elevated  A1c 5
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
## 2022-07-07 NOTE — PROVIDER CONTACT NOTE (OTHER) - ASSESSMENT
Patient continue to verbalized need to go home. Wandering the unit repeatedly.
Patient noted to be anxious and agitated with c/o pain to left leg pain.

## 2022-07-07 NOTE — DISCHARGE NOTE PROVIDER - CARE PROVIDER_API CALL
Your Primary Care doctor in [facility],   Phone: (   )    -  Fax: (   )    -  Follow Up Time:

## 2022-07-07 NOTE — BH CONSULTATION LIAISON ASSESSMENT NOTE - SUMMARY
62 year old M with PPHx of bipolar disorder and PMHx ETOH abuse/withdrawal, BIBA for alcohol intoxication. CL psychiatry consulted for anxiety and ETOH withdrawal.    Pt received 400mg Seroquel 7/6 PM  Seroquel 300mg recommended   Continue with Ativan taper      62 year old M with PPHx of bipolar disorder and PMHx ETOH abuse/withdrawal, BIBA for alcohol intoxication. CL psychiatry consulted for anxiety and ETOH withdrawal.    Patient currently being treated for alcohol withdrawal. Primary team initiated ativan taper. Patient seems to potentially minimize his current situation- however he is interested in inpatient addiction rehab. He does not appear acutely manic, psychotic or depressed. He denies SI.    Plan:  [] defer supervision to primary team in patient that recently tried to elope  [] lower seroquel to 300mg HS (ensure QTC < 500)  [] for anxiety- can use seroquel 25mg BID prn (so long as not concerned that symptoms are actual alcohol withdrawal symptoms)  [] Continue with Ativan taper per primary team and continue treatment of alcohol withdrawal per primary team  [] thiamine 500mg IV q8h while in house  [] SBIRT consult  [] SW to work with patient in potential for inpatient rehab
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
## 2022-07-07 NOTE — BH CONSULTATION LIAISON ASSESSMENT NOTE - RISK ASSESSMENT
Pt states he does get depressed about his situation of homelessness. Denies any access to firearms. States he is interested in ETOH detox and rehab.  Pt states he does get depressed about his situation of homelessness. Denies any access to firearms. States he is interested in ETOH detox and rehab. He also carries history of mood disorder and alcoholism. Currently he denies SI, is future oriented. While his chronic risk of harm will be moderate- his imminent risk is likely low.

## 2022-07-07 NOTE — DISCHARGE NOTE NURSING/CASE MANAGEMENT/SOCIAL WORK - PATIENT PORTAL LINK FT
You can access the FollowMyHealth Patient Portal offered by [facility] by registering at the following website: [email or web address]. By joining [facility]’s FollowMyHealth portal, you will also be able to view your health information using other applications (apps) compatible with our system.

## 2022-07-07 NOTE — PROGRESS NOTE ADULT - PROBLEM SELECTOR PLAN 2
CT abd pelvis: Very distended urinary bladder raises possibility of acute on chronic bladder outlet obstruction. There is also mild bilateral hydroureter.  Hepatic steatosis. Dilated common duct, could be related to biliary stasis.  Urology placed coude catheter, drained 1000ml  Obstructive uropathy likely d/t BPH, started on flomax  maintain cheema in setting of E. coli UTI  Cancel MRI for now as pt's alk phos and TB normal and transaminase minimally elevated  Monitor I & os CT abd pelvis: Very distended urinary bladder raises possibility of acute on chronic bladder outlet obstruction. There is also mild bilateral hydroureter.  Hepatic steatosis. Dilated common duct, could be related to biliary stasis.  Urology placed coude catheter, drained 1000ml  Obstructive uropathy likely d/t BPH, started on flomax, add proscar  maintain cheema in setting of E. coli UTI  Cancel MRI for now as pt's alk phos and TB normal and transaminase minimally elevated  Monitor I & os

## 2022-07-07 NOTE — BH CONSULTATION LIAISON ASSESSMENT NOTE - NSBHCHARTREVIEWLAB_PSY_A_CORE FT
13.2   4.27  )-----------( 224      ( 07 Jul 2022 07:31 )             38.3     07-07    133<L>  |  97<L>  |  12  ----------------------------<  94  4.1   |  24  |  0.68    Ca    9.4      07 Jul 2022 07:31  Phos  3.8     07-07  Mg     2.00     07-07    TPro  7.6  /  Alb  4.0  /  TBili  0.6  /  DBili  0.2  /  AST  42<H>  /  ALT  34  /  AlkPhos  115  07-07

## 2022-07-07 NOTE — BH CONSULTATION LIAISON ASSESSMENT NOTE - NSBHMSETHTASSOC_PSY_A_CORE
Problem: Pain  Goal: #Acceptable pain level achieved/maintained at rest using NRS/Faces  This goal is used for patients who can self-report.  Acceptable means the level is at or below the identified comfort/function goal.  Outcome: Outcome Not Met, Plan Adjusted  Patient stated head pain today during afternoon therapy. He has expressed that his mind hurts when therapy makes him think. He wanted to rest after his lunch. Sat in chair, then went to therapy.  Walking back from therapy, he stated the head pain was better. After resting a few minutes in bed the head pain was resolved.         Normal

## 2022-09-07 ENCOUNTER — EMERGENCY (EMERGENCY)
Facility: HOSPITAL | Age: 62
LOS: 1 days | Discharge: HOME | End: 2022-09-07
Attending: EMERGENCY MEDICINE | Admitting: EMERGENCY MEDICINE

## 2022-09-07 VITALS
RESPIRATION RATE: 18 BRPM | OXYGEN SATURATION: 98 % | DIASTOLIC BLOOD PRESSURE: 78 MMHG | SYSTOLIC BLOOD PRESSURE: 118 MMHG | HEART RATE: 80 BPM

## 2022-09-07 VITALS
DIASTOLIC BLOOD PRESSURE: 79 MMHG | TEMPERATURE: 98 F | HEART RATE: 84 BPM | OXYGEN SATURATION: 96 % | WEIGHT: 160.06 LBS | RESPIRATION RATE: 18 BRPM | SYSTOLIC BLOOD PRESSURE: 119 MMHG
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
## 2022-09-07 DIAGNOSIS — F17.210 NICOTINE DEPENDENCE, CIGARETTES, UNCOMPLICATED: ICD-10-CM

## 2022-09-07 DIAGNOSIS — S70.01XA CONTUSION OF RIGHT HIP, INITIAL ENCOUNTER: ICD-10-CM

## 2022-09-07 DIAGNOSIS — M25.551 PAIN IN RIGHT HIP: ICD-10-CM

## 2022-09-07 PROCEDURE — 99284 EMERGENCY DEPT VISIT MOD MDM: CPT | Mod: 25

## 2022-09-07 PROCEDURE — 73502 X-RAY EXAM HIP UNI 2-3 VIEWS: CPT | Mod: 26,RT

## 2022-09-07 RX ORDER — IBUPROFEN 200 MG
400 TABLET ORAL ONCE
Refills: 0 | Status: COMPLETED | OUTPATIENT
Start: 2022-09-07 | End: 2022-09-07

## 2022-09-07 RX ADMIN — Medication 400 MILLIGRAM(S): at 21:30

## 2022-09-07 NOTE — ED PROVIDER NOTE - CARE PROVIDER_API CALL
Augie Hernandez)  Orthopaedic Surgery; Sports Medicine  [address] [place] [address]  Phone: [phone]  Fax: [phone])-  Follow Up Time:     Wolfgang Arreaga)  Internal Medicine  [address] [address]  Phone: [phone]  Fax: [phone]  Follow Up Time:
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
## 2022-09-07 NOTE — ED PROVIDER NOTE - CARE PROVIDERS DIRECT ADDRESSES
,[email or web address],[email or web address]

## 2022-09-07 NOTE — ED PROVIDER NOTE - NSFOLLOWUPINSTRUCTIONS_ED_ALL_ED_FT
Please follow up with your primary care in [place].  If you are staying in [facility] you may visit the providers below.  Ibuprofen or Tylenol as needed for pain.  Your xrays did not show a fracture (break) at this time.

## 2022-09-07 NOTE — ED PROVIDER NOTE - CLINICAL SUMMARY MEDICAL DECISION MAKING FREE TEXT BOX
R hip pain s/p fell last night, pt states he had alcohol and tripped.  Patient can walk.  States he lives in [place] and his medical care is in [place].  Xray hip and pelvis R show no acute fracture on prelim read.  Ibuprofen given.  Follow up with ortho and primary care.
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
## 2022-09-07 NOTE — ED ADULT TRIAGE NOTE - WEIGHT IN KG
72.6 I will START or STAY ON the medications listed below when I get home from the hospital:    Dilaudid 2 mg oral tablet  -- 1-2  tab(s) by mouth every 4 hours - as needed for moderate to severe pain MDD:8 tabs  -- Caution federal law prohibits the transfer of this drug to any person other  than the person for whom it was prescribed.  May cause drowsiness.  Alcohol may intensify this effect.  Use care when operating dangerous machinery.  This prescription cannot be refilled.  Using more of this medication than prescribed may cause serious breathing problems.    -- Indication: For moderate to severe pain    celecoxib 100 mg oral capsule  -- 1 cap(s) by mouth 2 times a day (with meals) MDD:2 capsules  -- Do not take this drug if you are pregnant.  Medication should be taken with plenty of water.  Obtain medical advice before taking any non-prescription drugs as some may affect the action of this medication.  Take with food or milk.    -- Indication: For mild to moderate pain    Valium 5 mg oral tablet  -- 1 tab(s) by mouth every 8 hours  as needed for back spasm MDD:3 tabs  -- Caution federal law prohibits the transfer of this drug to any person other  than the person for whom it was prescribed.  Do not take this drug if you are pregnant.  May cause drowsiness.  Alcohol may intensify this effect.  Use care when operating dangerous machinery.    -- Indication: For back spasm

## 2022-09-07 NOTE — ED ADULT TRIAGE NOTE - SOURCE OF INFORMATION
RN notified by lab that the patient requires a second type and screen since this is first blood transfusion. Orders placed. Patient
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
## 2022-09-07 NOTE — ED PROVIDER NOTE - PATIENT PORTAL LINK FT
You can access the FollowMyHealth Patient Portal offered by [facility] by registering at the following website: [email or web address]. By joining [facility]’s FollowMyHealth portal, you will also be able to view your health information using other applications (apps) compatible with our system.
